# Patient Record
Sex: FEMALE | Race: WHITE | Employment: FULL TIME | URBAN - METROPOLITAN AREA
[De-identification: names, ages, dates, MRNs, and addresses within clinical notes are randomized per-mention and may not be internally consistent; named-entity substitution may affect disease eponyms.]

---

## 2018-01-01 ENCOUNTER — HOSPITAL ENCOUNTER (EMERGENCY)
Age: 48
End: 2018-07-03
Attending: EMERGENCY MEDICINE
Payer: COMMERCIAL

## 2018-01-01 VITALS — WEIGHT: 150 LBS

## 2018-01-01 DIAGNOSIS — I46.9 CARDIAC ARREST (HCC): Primary | ICD-10-CM

## 2018-01-01 LAB
GFR AFRICAN AMERICAN: 58
GFR NON-AFRICAN AMERICAN: 48 ML/MIN/1.73
GLUCOSE BLD-MCNC: 354 MG/DL (ref 74–109)
POC CHLORIDE: 107 MMOL/L (ref 100–108)
POC CREATININE: 1.2 MG/DL (ref 0.5–1)
POC POTASSIUM: 5.8 MMOL/L (ref 3.5–5)
POC SODIUM: 137 MMOL/L (ref 132–146)

## 2018-01-01 PROCEDURE — 2500000003 HC RX 250 WO HCPCS: Performed by: EMERGENCY MEDICINE

## 2018-01-01 PROCEDURE — 6360000002 HC RX W HCPCS

## 2018-01-01 PROCEDURE — 84295 ASSAY OF SERUM SODIUM: CPT

## 2018-01-01 PROCEDURE — 84132 ASSAY OF SERUM POTASSIUM: CPT

## 2018-01-01 PROCEDURE — 31500 INSERT EMERGENCY AIRWAY: CPT

## 2018-01-01 PROCEDURE — 82565 ASSAY OF CREATININE: CPT

## 2018-01-01 PROCEDURE — 2500000003 HC RX 250 WO HCPCS

## 2018-01-01 PROCEDURE — 82435 ASSAY OF BLOOD CHLORIDE: CPT

## 2018-01-01 PROCEDURE — 99285 EMERGENCY DEPT VISIT HI MDM: CPT

## 2018-01-01 PROCEDURE — 82947 ASSAY GLUCOSE BLOOD QUANT: CPT

## 2018-01-01 PROCEDURE — 6360000002 HC RX W HCPCS: Performed by: EMERGENCY MEDICINE

## 2018-01-01 PROCEDURE — 92950 HEART/LUNG RESUSCITATION CPR: CPT

## 2018-01-01 PROCEDURE — 2580000003 HC RX 258

## 2018-01-01 RX ORDER — AMIODARONE HYDROCHLORIDE 50 MG/ML
INJECTION, SOLUTION INTRAVENOUS DAILY PRN
Status: COMPLETED | OUTPATIENT
Start: 2018-01-01 | End: 2018-01-01

## 2018-01-01 RX ORDER — MAGNESIUM SULFATE HEPTAHYDRATE 500 MG/ML
INJECTION, SOLUTION INTRAMUSCULAR; INTRAVENOUS DAILY PRN
Status: COMPLETED | OUTPATIENT
Start: 2018-01-01 | End: 2018-01-01

## 2018-01-01 RX ORDER — CALCIUM CHLORIDE 100 MG/ML
INJECTION INTRAVENOUS; INTRAVENTRICULAR DAILY PRN
Status: COMPLETED | OUTPATIENT
Start: 2018-01-01 | End: 2018-01-01

## 2018-01-01 RX ADMIN — CALCIUM CHLORIDE 1 G: 100 INJECTION, SOLUTION INTRAVENOUS at 10:16

## 2018-01-01 RX ADMIN — LIDOCAINE HYDROCHLORIDE 100 MG: 20 INJECTION, SOLUTION INTRAVENOUS at 10:22

## 2018-01-01 RX ADMIN — Medication 50 MEQ: at 10:17

## 2018-01-01 RX ADMIN — AMIODARONE HYDROCHLORIDE 300 MG: 50 INJECTION, SOLUTION INTRAVENOUS at 10:19

## 2018-01-01 RX ADMIN — EPINEPHRINE 1 MG: 0.1 INJECTION, SOLUTION ENDOTRACHEAL; INTRACARDIAC; INTRAVENOUS at 10:30

## 2018-01-01 RX ADMIN — EPINEPHRINE 1 MG: 0.1 INJECTION, SOLUTION ENDOTRACHEAL; INTRACARDIAC; INTRAVENOUS at 10:21

## 2018-01-01 RX ADMIN — EPINEPHRINE 1 MG: 0.1 INJECTION, SOLUTION ENDOTRACHEAL; INTRACARDIAC; INTRAVENOUS at 10:14

## 2018-01-01 RX ADMIN — MAGNESIUM SULFATE HEPTAHYDRATE 2 G: 500 INJECTION, SOLUTION INTRAMUSCULAR; INTRAVENOUS at 10:28

## 2018-01-01 RX ADMIN — EPINEPHRINE 1 MG: 0.1 INJECTION, SOLUTION ENDOTRACHEAL; INTRACARDIAC; INTRAVENOUS at 10:25

## 2018-07-03 NOTE — ED PROVIDER NOTES
Department of Emergency Medicine   ED  Provider Note  Admit Date/RoomTime: 7/3/2018 10:15 AM  ED Room: 22/22  Chief Complaint   Cardiac Arrest (working out at the gym  witnessed down 30 minutes with out a pulse. arrived intubated with in [place] and pulseless   )    History of Present Illness   Source of history provided by:  EMS personnel. History/Exam Limitations: Intubated, unreponsive and presenting condition. Sanjeev Banks is a 50 y.o. old female with a past medical history of: No past medical history on file. presents to the emergency department by ambulance. She was performing a plank position in a yoga class then went to the bathroom. She was then found down and unresponsive in the bathroom. From EMS,  the patient received intubation, cpr, and 3 doses of IV epinephrine prior to arrival., for cardiac arrest, which occured unknown time prior to arrival.  The event was witnessed by no one. Exact down time from arrest until ambulance arrival unknown. Total Time from arrest until hospital arrival  Unknown, but the patient was on the ambulance for at approximately 20 minutes. Patient initial rhythm was: normal sinus. Code Status on file: No Order. In the emergency department, the patient was placed on the monitor and ventilator immediately. Pulse was checked and no pulse was palpated. CPR was immediately started. Multiple rounds of CPR, Epinephrine, and defibrillation were performed. Central venous and peripheral venous access obtained. The patient was shocked multiple times for v-fib. Calcium, bicarb, amiodarone, and lidocaine were also administered. The patient had no response to any intervention. Code was called at [phone]. Patient was coded for approximately 30 minutes in the ED, about 20 minutes in the ambulance, and had been down for an unknown duration of time. Family was notified and questions answered. Available History:  none at this time.     Prehospital Treatment:      [x]   CPR [x]   Intubation     [x]   IV Established     []   Defibrillation     []   External Pacer Placed     [x]   Epinephrine Given     []   Atropine Given     []   Glucose Given     []   Narcan Given       Response to Treatment:  Transient return of pulse: Yes. Sustained return of pulse:  No.    ROS     Please note that the ROS and Physical Examination are limited due to this patients due to condition and chronic condition and communication barrier. Past Surgical History:  has no past surgical history on file. Social History:    Family History: family history is not on file. Allergies: Patient has no allergy information on record. Physical Exam           ED Triage Vitals   BP Temp Temp src Pulse Resp SpO2 Height Weight   -- -- -- -- -- -- -- --      Oxygen Saturation Interpretation: Abnormal.    General: Unresponsive. Head: Atraumatic. Eyes:  Fixed and dilated. ENT: Airway patent. ETT in place. Cardiovascular: Heart sounds are Absent. Pulses are Absent. Respiratory: No spontaneous respirations. Equal breath sounds with controlled ventilation present. Abdominal: Not distended or ridgid  Musculoskeletal: No deformities. Skin: Pallor, mild facial cyanosis. Neurological: Unresponsive. Neurological exam limited due to clinical condition. Lab / Imaging Results   (All laboratory and radiology results have been personally reviewed by myself)  Labs:  Results for orders placed or performed during the hospital encounter of 07/03/18   POCT Venous   Result Value Ref Range    POC Sodium 137 132 - 146 mmol/L    POC Potassium 5.8 (H) 3.5 - 5.0 mmol/L    POC Chloride 107 100 - 108 mmol/L    POC Glucose 354 (H) 74 - 109 mg/dl    POC Creatinine 1.2 (H) 0.5 - 1.0 mg/dL    GFR Non-African American 48 >=60 mL/min/1.73    GFR  58        Imaging: All Radiology results interpreted by Radiologist unless otherwise noted. No orders to display   .     ED Course / Medical Decision Making     Medications   calcium chloride 10 % injection (1 g Intravenous Given 7/3/18 1016)   EPINEPHrine PF 1 MG/10ML injection (1 mg Intravenous Given 7/3/18 1030)   sodium bicarbonate 8.4 % injection (50 mEq Intravenous Given 7/3/18 1017)   amiodarone (CORDARONE) injection (300 mg Intravenous Given 7/3/18 1019)   magnesium sulfate injection (2 g Intravenous Given 7/3/18 1028)   lidocaine (cardiac) (XYLOCAINE) injection (100 mg Intravenous Given 7/3/18 1022)        Consultations:  None  Procedures:    Central Line Placement Procedure Note    Indication: vascular access    Consent: Unable to be obtained due to the emergent nature of this procedure. Procedure: The patient was positioned appropriately and the skin over the right femoral vein was prepped with Chloraprep. Local anesthesia was not performed due to the emergent nature of this procedure. A large bore needle was used to identify the vein. A guide wire was then inserted into the vein through the needle. A triple lumen catheter was then inserted into the vessel over the guide wire using the Seldinger technique. All ports showed good, free flowing blood return and were flushed with saline solution. The catheter was then secured to the skin with an occlusive dressing. An antibiotic disk was placed and the site was then covered with a sterile dressing. A post procedure X-ray was not indicated. The patient tolerated the procedure well. Complications: None      Fast Exam was negative, no cardiac movement on bedside ultrasound evaluation    Counseling:   I have spoken with the Spouse and family friend of the family that were present and informed them of the patients status. Assessment      1. Cardiac arrest [facility])       Plan   Other Disposition: . Electronically signed by Juhi Fernandes DO   DD: 7/3/18  **This report was transcribed using voice recognition software.  Every effort was made to ensure accuracy; however, inadvertent computerized transcription errors may be present.   Critical Care 30 minutes         Jonathan Bolaños DO  Resident  07/03/18 [id number]